# Patient Record
Sex: FEMALE | Race: WHITE
[De-identification: names, ages, dates, MRNs, and addresses within clinical notes are randomized per-mention and may not be internally consistent; named-entity substitution may affect disease eponyms.]

---

## 2019-09-21 ENCOUNTER — HOSPITAL ENCOUNTER (EMERGENCY)
Dept: HOSPITAL 56 - MW.ED | Age: 39
Discharge: HOME | End: 2019-09-21
Payer: COMMERCIAL

## 2019-09-21 DIAGNOSIS — J40: Primary | ICD-10-CM

## 2019-09-21 DIAGNOSIS — Z79.899: ICD-10-CM

## 2019-09-21 DIAGNOSIS — J32.9: ICD-10-CM

## 2019-09-21 PROCEDURE — 71045 X-RAY EXAM CHEST 1 VIEW: CPT

## 2019-09-21 PROCEDURE — 99283 EMERGENCY DEPT VISIT LOW MDM: CPT

## 2019-09-21 PROCEDURE — 94640 AIRWAY INHALATION TREATMENT: CPT

## 2019-09-21 PROCEDURE — 96372 THER/PROPH/DIAG INJ SC/IM: CPT

## 2019-09-21 NOTE — CR
INDICATION:



Pain, shortness of breath. 



TECHNIQUE:



AP portable view of the chest. 



COMPARISON:



None 



FINDINGS:



Cardiomediastinal silhouette: Within limits. 



Lungs and pleural space: Lungs are clear. No pleural effusion. No 

pneumothorax. 



IMPRESSION:



No acute pulmonary abnormality.



Dictated by Yaima Alba MD @ 9/21/2019 3:58:10 PM



Dictated by: Yaima Alba MD @ 09/21/2019 15:58:14



(Electronically Signed)

## 2019-09-21 NOTE — EDM.PDOC
ED HPI GENERAL MEDICAL PROBLEM





- General


Chief Complaint: Respiratory Problem


Stated Complaint: SHORTNESS OF BREATH, COUGH, VOMITING


Time Seen by Provider: 09/21/19 15:35


Source of Information: Reports: Patient





- History of Present Illness


INITIAL COMMENTS - FREE TEXT/NARRATIVE: 





HISTORY AND PHYSICAL:


History of present illness:


[]Patient presents with cough and shortness of breath sensation however is in 

no apparent distress





She was seen at [facility] clinic initially provided Z-Jimmy and albuterol for 

bronchitis, she has some improvement of symptoms however not fully resolved she 

does have a sinusitis on the left maxillary sinuses well with continued 

tenderness on palpation no fever nausea vomiting chills sweats no chest pain 

headache dizziness palpitation no bowel or urine symptoms








Review of systems: 


As per history of present illness and below otherwise all systems reviewed and 

negative.


Past medical history: 


As per history of present illness and as reviewed below otherwise 

noncontributory.


Surgical history: 


As per history of present illness and as reviewed below otherwise 

noncontributory.


Social history: 


No reported history of drug or alcohol abuse.


Family history: 


As per history of present illness and as reviewed below otherwise 

noncontributory.





Physical exam:


HEENT: Atraumatic, normocephalic, pupils reactive, negative for conjunctival 

pallor or scleral icterus, mucous membranes moist, throat clear, neck supple, 

nontender, trachea midline. sinus tenderness maxillary sinus left greater than 

right 


Lungs: Clear to auscultation, breath sounds equal bilaterally, chest nontender.


Heart: S1S2, regular, negative for clicks, rubs, or JVD.


Abdomen: Soft, nondistended, nontender. Negative for masses or 

hepatosplenomegaly. Negative for costovertebral tenderness.


Pelvis: Stable nontender.


Genitourinary: Deferred.


Rectal: Deferred.


Extremities: Atraumatic, negative for cords or calf pain. Neurovascular 

unremarkable.


Neuro: Awake, alert, oriented. Cranial nerves II through XII unremarkable. 

Cerebellum unremarkable. Motor and sensory unremarkable throughout. Exam 

nonfocal.


Diagnostics:


[Chest 1 view


]


Therapeutics:


[ Levaquin 750 by mouth daily 10 no refill


Prednisone 20 mg by mouth daily #5 no refill


Over-the-counter symptomatic therapy


Continue albuterol


Phenergan with codeine





impression: 


[] sinus situs/bronchitis 


Definitive disposition and diagnosis as appropriate pending reevaluation and 

review of above.


  ** chest


Pain Score (Numeric/FACES): 5





- Related Data


 Allergies











Allergy/AdvReac Type Severity Reaction Status Date / Time


 


No Known Allergies Allergy   Verified 09/21/19 15:28











Home Meds: 


 Home Meds





Albuterol [Proventil Neb Soln] 2 puff INH ASDIRECTED PRN 09/21/19 [History]


Azithromycin [Zithromax] 1 dose PO ASDIRECTED 09/21/19 [History]


Furosemide 40 mg PO DAILY 09/21/19 [History]


Levothyroxine [Levothroid] 137 mcg PO DAILY 09/21/19 [History]


Loratadine 10 mg PO DAILY 09/21/19 [History]


Losartan [Cozaar] 50 mg PO DAILY 09/21/19 [History]


Metoprolol Tartrate 100 mg PO DAILY 09/21/19 [History]


Omeprazole 40 mg PO DAILY 09/21/19 [History]


Potassium Chloride 10 meq PO DAILY 09/21/19 [History]











ED ROS GENERAL





- Review of Systems


Review Of Systems: See Below





ED EXAM, GENERAL





- Physical Exam


Exam: See Below





Course





- Vital Signs


Last Recorded V/S: 


 Last Vital Signs











Temp  96.9 F   09/21/19 15:25


 


Pulse  93   09/21/19 15:25


 


Resp  18   09/21/19 15:25


 


BP  144/105 H  09/21/19 15:25


 


Pulse Ox  97   09/21/19 15:25














- Orders/Labs/Meds


Orders: 


 Active Orders 24 hr











 Category Date Time Status


 


 RT Aerosol Therapy [RC] ASDIRECTED Care  09/21/19 15:30 Active


 


 Chest 1V Frontal [CR] Stat Exams  09/21/19 15:30 Taken











Meds: 


Medications














Discontinued Medications














Generic Name Dose Route Start Last Admin





  Trade Name Freq  PRN Reason Stop Dose Admin


 


Albuterol/Ipratropium  3 ml  09/21/19 15:30  09/21/19 15:40





  Duoneb 3.0-0.5 Mg/3 Ml  NEB  09/21/19 15:31  3 ml





  ONETIME ONE   Administration





     





     





     





     


 


Methylprednisolone Sodium Succinate  125 mg  09/21/19 15:30  09/21/19 15:52





  Solu-Medrol  IM  09/21/19 15:31  125 mg





  ONETIME ONE   Administration





     





     





     





     


 


Prednisone  20 mg  09/21/19 15:44  09/21/19 15:51





  Prednisone  PO  09/21/19 15:45  20 mg





  ONETIME ONE   Administration





     





     





     





     














Departure





- Departure


Time of Disposition: 15:56


Disposition: Home, Self-Care 01


Condition: Good


Clinical Impression: 


 Sinusitis, Bronchitis








- Discharge Information


Referrals: 


PCP,Unknown [Primary Care Provider] - 


Forms:  ED Department Discharge


Additional Instructions: 


Medication as prescribed


Consider Darcy pot daily


Zyrtec daily 10 mg


Continue albuterol as needed


Follow up with primary care in 2 weeks sooner as needed





[facility] - Primary [facility]


[address] [place] [id number]


Phone: [phone]


Fax: [phone]








The following information is given to patients seen in the emergency department 

who are being discharged to home. This information is to outline your options 

for follow-up care. We provide all patients seen in our emergency department 

with a follow-up referral.





The need for follow-up, as well as the timing and circumstances, are variable 

depending upon the specifics of your emergency department visit.





If you don't have a primary care physician on staff, we will provide you with a 

referral. We always advise you to contact your personal physician following an 

emergency department visit to inform them of the circumstance of the visit and 

for follow-up with them and/or the need for any referrals to a consulting 

specialist.





The emergency department will also refer you to a specialist when appropriate. 

This referral assures that you have the opportunity for follow-up care with a 

specialist. All of these measure are taken in an effort to provide you with 

optimal care, which includes your follow-up.





Under all circumstances we always encourage you to contact your private 

physician who remains a resource for coordinating your care. When calling for 

follow-up care, please make the office aware that this follow-up is from your 

recent emergency room visit. If for any reason you are refused follow-up, 

please contact the [facility] emergency department at [phone] 

and asked to speak to the emergency department charge nurse.








- My Orders


Last 24 Hours: 


My Active Orders





09/21/19 15:30


RT Aerosol Therapy [RC] ASDIRECTED 


Chest 1V Frontal [CR] Stat 














- Assessment/Plan


Last 24 Hours: 


My Active Orders





09/21/19 15:30


RT Aerosol Therapy [RC] ASDIRECTED 


Chest 1V Frontal [CR] Stat

## 2020-01-05 ENCOUNTER — HOSPITAL ENCOUNTER (EMERGENCY)
Dept: HOSPITAL 56 - MW.ED | Age: 40
Discharge: HOME | End: 2020-01-05
Payer: COMMERCIAL
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
## 2020-01-05 DIAGNOSIS — S80.01XA: ICD-10-CM

## 2020-01-05 DIAGNOSIS — Z79.899: ICD-10-CM

## 2020-01-05 DIAGNOSIS — S39.012A: Primary | ICD-10-CM

## 2020-01-05 DIAGNOSIS — I10: ICD-10-CM
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
## 2020-01-05 PROCEDURE — 72100 X-RAY EXAM L-S SPINE 2/3 VWS: CPT

## 2020-01-05 PROCEDURE — 96372 THER/PROPH/DIAG INJ SC/IM: CPT

## 2020-01-05 PROCEDURE — 73562 X-RAY EXAM OF KNEE 3: CPT

## 2020-01-05 PROCEDURE — 99283 EMERGENCY DEPT VISIT LOW MDM: CPT

## 2020-01-05 NOTE — CR
Indication:



Pain following fall.



Technique:



Three views of the right knee.



Comparison:



None



Findings:



No acute fracture or subluxation is identified. No joint effusion is 

identified. Minimal narrowing of the medial compartment is identified.



Impression:



No acute fracture



Dictated by Maria Teresa Gill MD @ Jan 5 2020  9:13PM



Signed by Dr. Maria Teresa Gill @ Jan 5 2020  9:14PM

## 2020-01-05 NOTE — EDM.PDOC
ED HPI GENERAL MEDICAL PROBLEM





- General


Chief Complaint: Lower Extremity Injury/Pain


Stated Complaint: SLIP AND FALL ON ICE


Time Seen by Provider: 01/05/20 18:42


Source of Information: Reports: Patient


History Limitations: Reports: No Limitations





- History of Present Illness


INITIAL COMMENTS - FREE TEXT/NARRATIVE: 


HISTORY AND PHYSICAL:





History of present illness:


Patient is a 39-year-old female who presents to the ED today with concern of 

right knee injury and low back pain after a fall that occurred when slipping on 

the ice just prior to arrival to the ED.  Patient states she was walking to the 

mailbox when she had slipped.  Patient states she landed on her right knee and 

then twisted her low back and caught herself on her side.  Patient states she 

did not directly hit her low back but does feel as if her low back muscles are 

"spasming."  Patient states she did not hit her head and she did not lose 

consciousness.  Patient states she has a history of hypertension and 

hypothyroidism but denies any other health history.  Patient denies any loss or 

retention of bowel bladder function or saddle anesthesia.





Patient denies fever, chills, chest pain, shortness of breath, or cough. Denies 

headache, neck stiff ness, change in vision, syncope, or near syncope. Denies 

nausea, vomiting, abdominal pain, diarrhea, constipation, or dysuria. Has not 

noted any blood in urine or stool. Patient has been eating and drinking 

appropriately.





Review of systems: 


As per history of present illness and below otherwise all systems reviewed and 

negative.





Past medical history: 


As per history of present illness and as reviewed below otherwise 

noncontributory.





Surgical history: 


As per history of present illness and as reviewed below otherwise 

noncontributory.





Social history: 


See social history for further information





Family history: 


As per history of present illness and as reviewed below otherwise 

noncontributory.





Physical exam:


General: Patient is alert, oriented, and in no acute distress. Patient sitting 

comfortably on exam table.


HEENT: Atraumatic, normocephalic, pupils equal and reactive bilaterally, 

negative for conjunctival pallor or scleral icterus, mucous membranes moist, 

TMs normal bilaterally, throat clear, neck supple, nontender, trachea midline. 

No drooling or trismus noted. No meningeal signs. No hot potato voice noted. 


Lungs: Clear to auscultation, breath sounds equal bilaterally, chest nontender.


Heart: S1S2, regular rate and rhythm without overt murmur


Abdomen: Soft, nondistended, nontender. Negative for masses or 

hepatosplenomegaly. Negative for costovertebral tenderness.


Pelvis: Stable nontender.


Genitourinary: Deferred.


Rectal: Deferred.


Skin: Intact, warm, dry. No lesions or rashes noted.


Extremities: Negative for cords or calf pain. Neurovascular unremarkable. There 

is a superficial abrasion of the right knee without bleeding.  Patient has full 

range of motion of bilateral lower extremities without pain or difficulty.  No 

obvious deformity of bilateral lower extremities. DP/PT pulses intact 

bilaterally with capillary refill less than 2 seconds.  No obvious deformity of 

the complete spine.  No step-offs, crepitus, or point tenderness to palpation 

of the complete spine.  Patient does have mild to moderate discomfort with 

palpation of the right sided paraspinous muscle of the lumbar spine.


Neuro: Awake, alert, oriented. Cranial nerves II through XII unremarkable. 

Cerebellum unremarkable. Motor and sensory unremarkable throughout. Exam 

nonfocal.





Notes:


Discussed importance for follow-up with a primary care provider.


Voices understanding and is agreeable to plan of care. Denies any further 

questions or concerns at this time.





Diagnostics:


Lumbar XR, knee XR





Therapeutics:


Toradol, Norflex, Knee sleeve





Prescription:


Diclofenac, Flexeril





Impression: 


Right knee contusion


Low back pain/strain





Plan:


1. Rest, ice, elevate the affected extremity and low back. You can apply ice 

and or heat 15 minutes on, 15 minutes off. 


2. Tylenol and/or Ibuprofen as directed for pain management or discomfort. 


3. Follow up with the primary care provider as discussed. Return to the ED as 

needed and as discussed.








Definitive disposition and diagnosis as appropriate pending reevaluation and 

review of above.





  ** Back, r knee


Pain Score (Numeric/FACES): 9





- Related Data


 Allergies











Allergy/AdvReac Type Severity Reaction Status Date / Time


 


No Known Allergies Allergy   Verified 01/05/20 18:33











Home Meds: 


 Home Meds





Albuterol [Proventil Neb Soln] 2 puff INH ASDIRECTED PRN 09/21/19 [History]


Azithromycin [Zithromax] 1 dose PO ASDIRECTED 09/21/19 [History]


Furosemide 40 mg PO DAILY 09/21/19 [History]


Levothyroxine [Levothroid] 137 mcg PO DAILY 09/21/19 [History]


Loratadine 10 mg PO DAILY 09/21/19 [History]


Losartan [Cozaar] 50 mg PO DAILY 09/21/19 [History]


Metoprolol Tartrate 100 mg PO DAILY 09/21/19 [History]


Omeprazole 40 mg PO DAILY 09/21/19 [History]


Potassium Chloride 10 meq PO DAILY 09/21/19 [History]











Past Medical History


HEENT History: Reports: None


Cardiovascular History: Reports: Hypertension


Respiratory History: Reports: Bronchitis, Recurrent


Gastrointestinal History: Reports: None


Genitourinary History: Reports: None


OB/GYN History: Reports: None


Musculoskeletal History: Reports: None


Neurological History: Reports: None


Psychiatric History: Reports: None


Endocrine/Metabolic History: Reports: None


Hematologic History: Reports: None


Immunologic History: Reports: None


Oncologic (Cancer) History: Reports: None


Dermatologic History: Reports: None





- Infectious Disease History


Infectious Disease History: Reports: None





- Past Surgical History


Head Surgeries/Procedures: Reports: None


HEENT Surgical History: Reports: Adenoidectomy, Myringotomy w Tube(s), 

Tonsillectomy


Cardiovascular Surgical History: Reports: None


Respiratory Surgical History: Reports: None


GI Surgical History: Reports: Appendectomy


Female  Surgical History: Reports: None


Endocrine Surgical History: Reports: None


Neurological Surgical History: Reports: None


Musculoskeletal Surgical History: Reports: None


Oncologic Surgical History: Reports: None


Dermatological Surgical History: Reports: None





Social & Family History





- Family History


Family Medical History: Noncontributory





- Tobacco Use


Smoking Status *Q: Never Smoker


Second Hand Smoke Exposure: No





- Caffeine Use


Caffeine Use: Reports: Coffee





Review of Systems





- Review of Systems


Review Of Systems: Comprehensive ROS is negative, except as noted in HPI.





ED EXAM, GENERAL





- Physical Exam


Exam: See Below (see dictation)





Course





- Vital Signs


Last Recorded V/S: 


 Last Vital Signs











Temp  99.4 F   01/05/20 18:33


 


Pulse  78   01/05/20 18:33


 


Resp  22 H  01/05/20 18:33


 


BP  129/77   01/05/20 18:33


 


Pulse Ox  95   01/05/20 18:33














- Orders/Labs/Meds


Meds: 


Medications














Discontinued Medications














Generic Name Dose Route Start Last Admin





  Trade Name Freq  PRN Reason Stop Dose Admin


 


Ketorolac Tromethamine  60 mg  01/05/20 19:28  01/05/20 19:46





  Toradol  IM  01/05/20 19:29  60 mg





  ONETIME ONE   Administration





     





     





     





     


 


Orphenadrine Citrate  60 mg  01/05/20 19:28  01/05/20 19:47





  Norflex  IM  01/05/20 19:29  60 mg





  ONETIME ONE   Administration





     





     





     





     














Departure





- Departure


Time of Disposition: 21:21


Disposition: Home, Self-Care 01


Clinical Impression: 


Knee pain


Qualifiers:


 Chronicity: acute Laterality: right Qualified Code(s): M25.561 - Pain in right 

knee





Low back pain


Qualifiers:


 Chronicity: acute Back pain laterality: bilateral Sciatica presence: without 

sciatica Qualified Code(s): M54.5 - Low back pain








- Discharge Information


Referrals: 


Vladislav Griffiths MD [Primary Care Provider] - 


Forms:  ED Department Discharge


Additional Instructions: 


The following information is given to patients seen in the emergency department 

who are being discharged to home. This information is to outline your options 

for follow-up care. We provide all patients seen in our emergency department 

with a follow-up referral.





The need for follow-up, as well as the timing and circumstances, are variable 

depending upon the specifics of your emergency department visit.





If you don't have a primary care physician on staff, we will provide you with a 

referral. We always advise you to contact your personal physician following an 

emergency department visit to inform them of the circumstance of the visit and 

for follow-up with them and/or the need for any referrals to a consulting 

specialist.





The emergency department will also refer you to a specialist when appropriate. 

This referral assures that you have the opportunity for follow-up care with a 

specialist. All of these measure are taken in an effort to provide you with 

optimal care, which includes your follow-up.





Under all circumstances we always encourage you to contact your private 

physician who remains a resource for coordinating your care. When calling for 

follow-up care, please make the office aware that this follow-up is from your 

recent emergency room visit. If for any reason you are refused follow-up, 

please contact the [facility] Emergency 

Department at [phone] and asked to speak to the emergency department 

charge nurse.





[facility]


Primary Care


[address] [place] [id number]


Phone: [phone]


Fax: [phone]





[facility]


[address] [place] [address]


Phone: [phone]


Fax: [phone]





1. Rest, ice, elevate the affected extremity and low back. You can apply ice 

and or heat 15 minutes on, 15 minutes off. 


2. Tylenol as directed for pain management or discomfort. Take medication as 

prescribed.


3. Follow up with the primary care provider as discussed. Return to the ED as 

needed and as discussed





 











Sepsis Event Note





- Evaluation


Sepsis Screening Result: No Definite Risk





- Focused Exam


Vital Signs: 


 Vital Signs











  Temp Pulse Resp BP Pulse Ox


 


 01/05/20 18:33  99.4 F  78  22 H  129/77  95











Date Exam was Performed: 01/05/20


Time Exam was Performed: 21:21

## 2020-01-05 NOTE — CR
Indication:



Pain following fall.



Technique:



Three views of the lumbar spine.



Comparison:



None



Findings:



The alignment of the lumbar spine is within normal limits. The vertebral 

body heights are well maintained. The intervertebral disc space heights are 

well maintained. No acute fracture subluxation is identified. Physiologic 

narrowing is identified at L5-S1.



Impression:



No acute fracture.



Dictated by Maria Teresa Gill MD @ Jan 5 2020  9:14PM



Signed by Dr. Maria Teresa Gill @ Jan 5 2020  9:15PM